# Patient Record
Sex: MALE | Race: WHITE | NOT HISPANIC OR LATINO | URBAN - METROPOLITAN AREA
[De-identification: names, ages, dates, MRNs, and addresses within clinical notes are randomized per-mention and may not be internally consistent; named-entity substitution may affect disease eponyms.]

---

## 2022-11-09 ENCOUNTER — APPOINTMENT (RX ONLY)
Dept: URBAN - METROPOLITAN AREA CLINIC 307 | Facility: CLINIC | Age: 47
Setting detail: DERMATOLOGY
End: 2022-11-09

## 2022-11-09 DIAGNOSIS — L30.9 DERMATITIS, UNSPECIFIED: ICD-10-CM | Status: INADEQUATELY CONTROLLED

## 2022-11-09 PROCEDURE — ? MEDICATION COUNSELING

## 2022-11-09 PROCEDURE — ? PRESCRIPTION

## 2022-11-09 PROCEDURE — 99204 OFFICE O/P NEW MOD 45 MIN: CPT

## 2022-11-09 PROCEDURE — ? PRESCRIPTION MEDICATION MANAGEMENT

## 2022-11-09 PROCEDURE — ? COUNSELING

## 2022-11-09 PROCEDURE — ? ADDITIONAL NOTES

## 2022-11-09 RX ORDER — CLOBETASOL PROPIONATE 0.5 MG/G
OINTMENT TOPICAL BID
Qty: 60 | Refills: 1 | Status: ERX | COMMUNITY
Start: 2022-11-09

## 2022-11-09 RX ADMIN — CLOBETASOL PROPIONATE: 0.5 OINTMENT TOPICAL at 00:00

## 2022-11-09 ASSESSMENT — LOCATION ZONE DERM: LOCATION ZONE: FEET

## 2022-11-09 ASSESSMENT — LOCATION SIMPLE DESCRIPTION DERM: LOCATION SIMPLE: RIGHT FOOT

## 2022-11-09 ASSESSMENT — LOCATION DETAILED DESCRIPTION DERM: LOCATION DETAILED: RIGHT MEDIAL DORSAL FOOT

## 2022-11-09 NOTE — PROCEDURE: MEDICATION COUNSELING
Xelcelez Pregnancy And Lactation Text: This medication is Pregnancy Category D and is not considered safe during pregnancy.  The risk during breast feeding is also uncertain.

## 2022-11-09 NOTE — PROCEDURE: MEDICATION COUNSELING
study.    Further clinical correlation and followup recommended.         Tamra Abarca MD  Board Certified Neurologist    D: 03/21/2018 10:27:58       T: 03/21/2018 11:08:53     [email or web address]  Job#: [id number]     Doc#: [id number] Acitretin Counseling:  I discussed with the patient the risks of acitretin including but not limited to hair loss, dry lips/skin/eyes, liver damage, hyperlipidemia, depression/suicidal ideation, photosensitivity.  Serious rare side effects can include but are not limited to pancreatitis, pseudotumor cerebri, bony changes, clot formation/stroke/heart attack.  Patient understands that alcohol is contraindicated since it can result in liver toxicity and significantly prolong the elimination of the drug by many years.

## 2022-11-09 NOTE — PROCEDURE: MEDICATION COUNSELING
Detailed exam Albendazole Pregnancy And Lactation Text: This medication is Pregnancy Category C and it isn't known if it is safe during pregnancy. It is also excreted in breast milk.

## 2022-11-09 NOTE — PROCEDURE: PRESCRIPTION MEDICATION MANAGEMENT
Detail Level: Zone
Initiate Treatment: Clobetasol ointment BID for up to 2 weeks/month (can apply under occlusion QHS)
Render In Strict Bullet Format?: No

## 2024-04-26 NOTE — PROCEDURE: MEDICATION COUNSELING
[phone] Low Dose Naltrexone Counseling- I discussed with the patient the potential risks and side effects of low dose naltrexone including but not limited to: more vivid dreams, headaches, nausea, vomiting, abdominal pain, fatigue, dizziness, and anxiety.

## 2024-05-19 NOTE — PROCEDURE: MEDICATION COUNSELING
Medication Scribe Admission Medication History    Admission medication history is complete. The information provided in this note is only as accurate as the sources available at the time of the update.    Information Source(s): Patient and CareEverywhere/SureScripts via in-person    Pertinent Information: n/a    Changes made to PTA medication list:  Added: None  Deleted: flomax, zanaflex  Changed: metformin regimen from BID to both in the morning.    Allergies reviewed with patient and updates made in EHR: yes    Medication History Completed By: SUNITA BETANCOURT 5/19/2024 5:17 PM    PTA Med List   Medication Sig Last Dose    atorvastatin (LIPITOR) 10 MG tablet Take 1 tablet (10 mg) by mouth daily 5/19/2024 at 0600    diclofenac (VOLTAREN) 1 % topical gel Apply 4 g topically 3 times daily as needed for moderate pain (bursitis) Unknown at unkn    hydrochlorothiazide (HYDRODIURIL) 12.5 MG tablet Take 1 tablet (12.5 mg) by mouth daily 5/19/2024 at 0600    levothyroxine (SYNTHROID/LEVOTHROID) 25 MCG tablet Take 1 tablet (25 mcg) by mouth daily 5/19/2024 at 0600    metFORMIN (GLUCOPHAGE) 500 MG tablet Take 1 tablet (500 mg) by mouth 2 times daily (with meals) (Patient taking differently: Take 1,000 mg by mouth daily (with breakfast)) 5/19/2024 at 0600    metoprolol succinate ER (TOPROL XL) 100 MG 24 hr tablet 1 1/2 ( 150 mg ) po daily (Patient taking differently: Take 150 mg by mouth daily) 5/19/2024 at 0600    multivitamin w/minerals (THERA-VIT-M) tablet Take 1 tablet by mouth daily 5/19/2024 at 0600    omeprazole (PRILOSEC) 20 MG DR capsule TAKE 1 CAPSULE BY MOUTH ONCE DAILY 30 TO 60 MINUTES BEFORE A MEAL (Patient taking differently: Take 20 mg by mouth daily 30 TO 60 MINUTES BEFORE A MEAL) 5/19/2024 at 0600    spironolactone (ALDACTONE) 25 MG tablet Take 1 tablet (25 mg) by mouth daily 5/19/2024 at 0600    tiZANidine (ZANAFLEX) 4 MG tablet TAKE 1/2 (ONE-HALF) TO 1  TABLET BY MOUTH UP TO THREE TIMES DAILY AS NEEDED  (Patient taking differently: Take 2-4 mg by mouth 3 times daily as needed for muscle spasms Usually takes at 4pm with tramadol) 5/18/2024 at 1600    traMADol (ULTRAM) 50 MG tablet Take 1 tablet (50 mg) by mouth every 6 hours as needed for moderate to severe pain (Patient taking differently: Take 50 mg by mouth every 6 hours as needed for moderate to severe pain Usually takes at 4pm with zanaflex) 5/18/2024 at 1400    valsartan (DIOVAN) 160 MG tablet Take 1 tablet (160 mg) by mouth daily 5/19/2024 at 0600        Ivermectin Counseling:  Patient instructed to take medication on an empty stomach with a full glass of water.  Patient informed of potential adverse effects including but not limited to nausea, diarrhea, dizziness, itching, and swelling of the extremities or lymph nodes.  The patient verbalized understanding of the proper use and possible adverse effects of ivermectin.  All of the patient's questions and concerns were addressed.

## 2024-08-19 NOTE — PROCEDURE: ADDITIONAL NOTES
Patient is requesting that Urology referral be emailed to him at [email or web address] - printed referral and placed upfront  
Render Risk Assessment In Note?: no
Detail Level: Simple
Additional Notes: Recommended biopsy. Pt declined. Will consider at follow up pending improvement.